# Patient Record
(demographics unavailable — no encounter records)

---

## 2024-11-04 NOTE — ASSESSMENT
[FreeTextEntry1] : PCP: Dr. Robinson Maier  59 year old Myriad Negative female here for follow up. She is s/p 7/9/21 Left breast localizer lumpectomy, veraform, SLNbx and oncoplastic w/ Dr. Willard after neoadj. for triple negative malignancy. 7/9/21 Surgical PATH: COMPLETE path response. Breast tissue with biopsy site changes, L breast negative for malignancy, benign breast tissue, LN negative for metastatic carcinoma 0/4, R breast benign breast tissue, negative for malignancy  She will be 4 years out from surgery this summer, feeling well overall. No breast complaints, she does note discomfort in L axilla but full ROM, no clinical lymphedema. Pt saw Dr. Griffith recently, finished 9/21. Med/onc is Dr. Lemus.  S/p office removal of Veraform suture knot/granuloma - 6/27/22. Found on u/s and mmg from 6/10/22 to the lump felt by pt was a knot of the veraform suture.  She is s/p 7/9/21 Left breast localizer lumpectomy, veraform, SLNbx and oncoplastic w/ Dr. Willard after neoadj. 7/9/21 Surgical PATH: COMPLETE path response. Breast tissue with biopsy site changes, L breast negative for malignancy, benign breast tissue, LN negative for metastatic carcinoma 0/4, R breast benign breast tissue, negative for malignancy  Had neoadjuvant TC for triple negative L breast cancer 6/8/21 w/ Dr. Lemus. PET: 4/21/22, PET, [place] B and C, post op changes on left, no evidence of mets. Lung changes stable, most likely XRT changes, seen by DR. Mathur, pulmonary. Chest CT 5/23- no specific CT evidence of recurrent or metastatic spread of dx within the thorax. Multiple stable pulmonary nodules measuring up to 4mm in size are unchanged since 7/5/2021 and are most likely benign.  2/1/22, NFR, Bilat dMMG: FG, L UOQ surgical suture/scarring and L post txt changes, no susp findings; Bilat U/S: R no susp solid mass, L no susp solid mass, skin thickening c/w posttreatment change, rec mmg 1 year, BR2  2/22/24 NFR, bilat dMMG and US: Scattered FG. No suspicious mass, suspicious microcalcifications, or other sign of malignancy is identified. Stable postsurgical changes left breast. ANNA- Grade 0. US: R- No suspicious solid mass. Stable scarring 6:00 position. L- No suspicious solid mass. Stable scarring 3:00 position. Impression: No mmg or US evidence of malignancy. Rec mmg in 1yr. BR2.  Fhx: Sister dx Breast CA 59 yo, maternal cousin Breast CA at 42 yo-? genetic testing done, parents from [place]-? AJ.  CBE: Bilat reduction oncoplastic scars, L postop and radiation changes, oleksandr in LIQ. left 3:00 scar tissue from where veraform suture was partially removed. No axillary or SC lymphadenopathy. Reviewed most recent mmg and u/s and CBE, ANNETTE. Pt without breast complaints. Pt still thinking about getting revision on her reconstruction. Pt planning on going to [place] for Thanksgiving because her son is stationed there.  Plan; almost 4 years out. F.u with Dr. Lemus as scheduled. F/u 1 year SOZO today Bilat mmg 2/23/25

## 2024-11-04 NOTE — ASSESSMENT
[FreeTextEntry1] : PCP: Dr. Robinson Maier  59 year old Myriad Negative female here for follow up. She is s/p 7/9/21 Left breast localizer lumpectomy, veraform, SLNbx and oncoplastic w/ Dr. Willard after neoadj. for triple negative malignancy. 7/9/21 Surgical PATH: COMPLETE path response. Breast tissue with biopsy site changes, L breast negative for malignancy, benign breast tissue, LN negative for metastatic carcinoma 0/4, R breast benign breast tissue, negative for malignancy  She will be 4 years out from surgery this summer, feeling well overall. No breast complaints, she does note discomfort in L axilla but full ROM, no clinical lymphedema. Pt saw Dr. Griffith recently, finished 9/21. Med/onc is Dr. Lemus.  S/p office removal of Veraform suture knot/granuloma - 6/27/22. Found on u/s and mmg from 6/10/22 to the lump felt by pt was a knot of the veraform suture.  She is s/p 7/9/21 Left breast localizer lumpectomy, veraform, SLNbx and oncoplastic w/ Dr. Willard after neoadj. 7/9/21 Surgical PATH: COMPLETE path response. Breast tissue with biopsy site changes, L breast negative for malignancy, benign breast tissue, LN negative for metastatic carcinoma 0/4, R breast benign breast tissue, negative for malignancy  Had neoadjuvant TC for triple negative L breast cancer 6/8/21 w/ Dr. Lemus. PET: 4/21/22, PET, [place] B and C, post op changes on left, no evidence of mets. Lung changes stable, most likely XRT changes, seen by DR. Mathur, pulmonary. Chest CT 5/23- no specific CT evidence of recurrent or metastatic spread of dx within the thorax. Multiple stable pulmonary nodules measuring up to 4mm in size are unchanged since 7/5/2021 and are most likely benign.  2/1/22, NFR, Bilat dMMG: FG, L UOQ surgical suture/scarring and L post txt changes, no susp findings; Bilat U/S: R no susp solid mass, L no susp solid mass, skin thickening c/w posttreatment change, rec mmg 1 year, BR2  2/22/24 NFR, bilat dMMG and US: Scattered FG. No suspicious mass, suspicious microcalcifications, or other sign of malignancy is identified. Stable postsurgical changes left breast. ANNA- Grade 0. US: R- No suspicious solid mass. Stable scarring 6:00 position. L- No suspicious solid mass. Stable scarring 3:00 position. Impression: No mmg or US evidence of malignancy. Rec mmg in 1yr. BR2.  Fhx: Sister dx Breast CA 59 yo, maternal cousin Breast CA at 44 yo-? genetic testing done, parents from [place]-? AJ.  CBE: Bilat reduction oncoplastic scars, L postop and radiation changes, oleksandr in LIQ. left 3:00 scar tissue from where veraform suture was partially removed. No axillary or SC lymphadenopathy. Reviewed most recent mmg and u/s and CBE, ANNETTE. Pt without breast complaints. Pt still thinking about getting revision on her reconstruction. Pt planning on going to [place] for Thanksgiving because her son is stationed there.  Plan; almost 4 years out. F.u with Dr. Lemus as scheduled. F/u 1 year SOZO today Bilat mmg 2/23/25

## 2024-11-04 NOTE — DATA REVIEWED
[FreeTextEntry1] : 2/22/24 NFR, bilat dMMG and US: Scattered FG. No suspicious mass, suspicious microcalcifications, or other sign of malignancy is identified. Stable postsurgical changes left breast. ANNA- Grade 0. US: R- No suspicious solid mass. Stable scarring 6:00 position. L- No suspicious solid mass. Stable scarring 3:00 position. Impression: No mmg or US evidence of malignancy. Rec mmg in 1yr. BR2.

## 2024-11-04 NOTE — PAST MEDICAL HISTORY
[Menarche Age ____] : age at menarche was [unfilled] [Menopause Age____] : age at menopause was [unfilled] [History of Hormone Replacement Treatment] : has a history of hormone replacement treatment [Definite ___ (Date)] : the last menstrual period was [unfilled] [Total Preg ___] : G[unfilled] [Live Births ___] : P[unfilled]  [Living ___] : Living: [unfilled] [Age At Live Birth ___] : Age at live birth: [unfilled] [FreeTextEntry7] : yes, 2-3 yrs [FreeTextEntry8] : no

## 2024-11-04 NOTE — HISTORY OF PRESENT ILLNESS
[FreeTextEntry1] : PCP: Dr. Robinson Maier  59 year old Myriad Negative female here for follow up. She is s/p 7/9/21 Left breast localizer lumpectomy, veraform, SLNbx and oncoplastic w/ Dr. Willard after neoadj. for triple negative malignancy. 7/9/21 Surgical PATH: COMPLETE path response. Breast tissue with biopsy site changes, L breast negative for malignancy, benign breast tissue, LN negative for metastatic carcinoma 0/4, R breast benign breast tissue, negative for malignancy  She will be 4 years out from surgery this summer, feeling well overall. No breast complaints, she does note discomfort in L axilla but full ROM, no clinical lymphedema. Pt saw Dr. Griffith recently, finished 9/21. Med/onc is Dr. Lemus.  S/p office removal of Veraform suture knot/granuloma - 6/27/22. Found on u/s and mmg from 6/10/22 to the lump felt by pt was a knot of the veraform suture.  She is s/p 7/9/21 Left breast localizer lumpectomy, veraform, SLNbx and oncoplastic w/ Dr. Willard after neoadj. 7/9/21 Surgical PATH: COMPLETE path response. Breast tissue with biopsy site changes, L breast negative for malignancy, benign breast tissue, LN negative for metastatic carcinoma 0/4, R breast benign breast tissue, negative for malignancy  Had neoadjuvant TC for triple negative L breast cancer 6/8/21 w/ Dr. Lemus. PET: 4/21/22, PET, [place] B and C, post op changes on left, no evidence of mets. Lung changes stable, most likely XRT changes, seen by DR. Mathur, pulmonary. Chest CT 5/23- no specific CT evidence of recurrent or metastatic spread of dx within the thorax. Multiple stable pulmonary nodules measuring up to 4mm in size are unchanged since 7/5/2021 and are most likely benign.  2/1/22, NFR, Bilat dMMG: FG, L UOQ surgical suture/scarring and L post txt changes, no susp findings; Bilat U/S: R no susp solid mass, L no susp solid mass, skin thickening c/w posttreatment change, rec mmg 1 year, BR2  2/22/24 NFR, bilat dMMG and US: Scattered FG. No suspicious mass, suspicious microcalcifications, or other sign of malignancy is identified. Stable postsurgical changes left breast. ANNA- Grade 0. US: R- No suspicious solid mass. Stable scarring 6:00 position. L- No suspicious solid mass. Stable scarring 3:00 position. Impression: No mmg or US evidence of malignancy. Rec mmg in 1yr. BR2.  Fhx: Sister dx Breast CA 57 yo, maternal cousin Breast CA at 44 yo-? genetic testing done, parents from [place]-? AJ.  CBE: Bilat reduction oncoplastic scars, L postop and radiation changes, oleksandr in LIQ. left 3:00 scar tissue from where veraform suture was partially removed. No axillary or SC lymphadenopathy. Reviewed most recent mmg and u/s and CBE, ANNETTE. Pt left before SOZO could be completed. Reviewed with pt, discomfort in axilla is at scar, rec scar massage. No suspicious lymphadenopathy. Pt planning on going to [place] for Thanksgiving because her son is stationed there.  Plan; almost 4 years out. F.u with Dr. Lemus as scheduled. F/u 1 year SOZO today Bilat mmg 2/23/25

## 2024-11-04 NOTE — PHYSICAL EXAM
[Normocephalic] : normocephalic [Atraumatic] : atraumatic [Supple] : supple [No Supraclavicular Adenopathy] : no supraclavicular adenopathy [Examined in the supine and seated position] : examined in the supine and seated position [Symmetrical] : symmetrical [Bra Size: ___] : Bra Size: [unfilled] [No dominant masses] : no dominant masses in right breast  [No dominant masses] : no dominant masses left breast [No Nipple Retraction] : no left nipple retraction [No Nipple Discharge] : no left nipple discharge [No Axillary Lymphadenopathy] : no left axillary lymphadenopathy [No Edema] : no edema [No Swelling] : no swelling [Full ROM] : full range of motion [No Rashes] : no rashes [No Ulceration] : no ulceration [de-identified] :  Bilat reduction oncoplastic scars, L postop and radiation changes, oleksandr in LIQ. left 3:00 scar tissue and indentation from where veraform suture was partially removed. No axillary or SC lymphadenopathy. [TextEntry] : Psych: Appropriate, NAD, A&Ox3 Neuro: Intact grossly, gait normal

## 2024-11-04 NOTE — PROCEDURE
[FreeTextEntry1] : Thelma measurement  [FreeTextEntry2] : Lymphedema analysis  [FreeTextEntry3] : The patient had a follow up SOZO measurement which I reviewed today. The score is within normal limits, see flowsheet. Bioimpedance spectroscopy helps identify the onset of lymphedema in an arm or leg before patients experience noticeable swelling. Research has shown that the early detection of lymphedema using L-Dex combined with treatment can reduce progression to chronic lymphedema by 95% in breast cancer patients. Whenever possible, patients are tested for baseline L-Dex score before cancer treatment begins and then are reassessed during regular follow-up visits using the SOZO device. Otherwise, this can be started postoperatively and continued during regular follow-up visits. If the patients L-Dex score increases above normal levels, that is a sign that lymphedema is developing and a referral is made to physical therapy for further evaluation and early compression treatment. Lymphedema assessment with the SOZO L-Dex score is recommended to be done every 3 months for the first 3 years and then every 6 months for years 4 and 5 followed by annually afterwards.

## 2024-11-04 NOTE — PHYSICAL EXAM
[Normocephalic] : normocephalic [Atraumatic] : atraumatic [Supple] : supple [No Supraclavicular Adenopathy] : no supraclavicular adenopathy [Examined in the supine and seated position] : examined in the supine and seated position [Symmetrical] : symmetrical [Bra Size: ___] : Bra Size: [unfilled] [No dominant masses] : no dominant masses in right breast  [No dominant masses] : no dominant masses left breast [No Nipple Retraction] : no left nipple retraction [No Nipple Discharge] : no left nipple discharge [No Axillary Lymphadenopathy] : no left axillary lymphadenopathy [No Edema] : no edema [No Swelling] : no swelling [Full ROM] : full range of motion [No Rashes] : no rashes [No Ulceration] : no ulceration [de-identified] :  Bilat reduction oncoplastic scars, L postop and radiation changes, oleksandr in LIQ. left 3:00 scar tissue and indentation from where veraform suture was partially removed. No axillary or SC lymphadenopathy. [TextEntry] : Psych: Appropriate, NAD, A&Ox3 Neuro: Intact grossly, gait normal

## 2024-11-04 NOTE — HISTORY OF PRESENT ILLNESS
[FreeTextEntry1] : PCP: Dr. Robinson Maier  59 year old Myriad Negative female here for follow up. She is s/p 7/9/21 Left breast localizer lumpectomy, veraform, SLNbx and oncoplastic w/ Dr. Willard after neoadj. for triple negative malignancy. 7/9/21 Surgical PATH: COMPLETE path response. Breast tissue with biopsy site changes, L breast negative for malignancy, benign breast tissue, LN negative for metastatic carcinoma 0/4, R breast benign breast tissue, negative for malignancy  She will be 4 years out from surgery this summer, feeling well overall. No breast complaints, she does note discomfort in L axilla but full ROM, no clinical lymphedema. Pt saw Dr. Griffith recently, finished 9/21. Med/onc is Dr. Lemus.  S/p office removal of Veraform suture knot/granuloma - 6/27/22. Found on u/s and mmg from 6/10/22 to the lump felt by pt was a knot of the veraform suture.  She is s/p 7/9/21 Left breast localizer lumpectomy, veraform, SLNbx and oncoplastic w/ Dr. Willard after neoadj. 7/9/21 Surgical PATH: COMPLETE path response. Breast tissue with biopsy site changes, L breast negative for malignancy, benign breast tissue, LN negative for metastatic carcinoma 0/4, R breast benign breast tissue, negative for malignancy  Had neoadjuvant TC for triple negative L breast cancer 6/8/21 w/ Dr. Lemus. PET: 4/21/22, PET, [place] B and C, post op changes on left, no evidence of mets. Lung changes stable, most likely XRT changes, seen by DR. Mathur, pulmonary. Chest CT 5/23- no specific CT evidence of recurrent or metastatic spread of dx within the thorax. Multiple stable pulmonary nodules measuring up to 4mm in size are unchanged since 7/5/2021 and are most likely benign.  2/1/22, NFR, Bilat dMMG: FG, L UOQ surgical suture/scarring and L post txt changes, no susp findings; Bilat U/S: R no susp solid mass, L no susp solid mass, skin thickening c/w posttreatment change, rec mmg 1 year, BR2  2/22/24 NFR, bilat dMMG and US: Scattered FG. No suspicious mass, suspicious microcalcifications, or other sign of malignancy is identified. Stable postsurgical changes left breast. ANNA- Grade 0. US: R- No suspicious solid mass. Stable scarring 6:00 position. L- No suspicious solid mass. Stable scarring 3:00 position. Impression: No mmg or US evidence of malignancy. Rec mmg in 1yr. BR2.  Fhx: Sister dx Breast CA 57 yo, maternal cousin Breast CA at 42 yo-? genetic testing done, parents from [place]-? AJ.  CBE: Bilat reduction oncoplastic scars, L postop and radiation changes, oleksandr in LIQ. left 3:00 scar tissue from where veraform suture was partially removed. No axillary or SC lymphadenopathy. Reviewed most recent mmg and u/s and CBE, ANNETTE. Pt left before SOZO could be completed. Reviewed with pt, discomfort in axilla is at scar, rec scar massage. No suspicious lymphadenopathy. Pt planning on going to [place] for Thanksgiving because her son is stationed there.  Plan; almost 4 years out. F.u with Dr. Lemus as scheduled. F/u 1 year SOZO today Bilat mmg 2/23/25